# Patient Record
Sex: FEMALE | URBAN - NONMETROPOLITAN AREA
[De-identification: names, ages, dates, MRNs, and addresses within clinical notes are randomized per-mention and may not be internally consistent; named-entity substitution may affect disease eponyms.]

---

## 2019-07-31 ENCOUNTER — APPOINTMENT (RX ONLY)
Dept: URBAN - NONMETROPOLITAN AREA CLINIC 9 | Facility: CLINIC | Age: 64
Setting detail: DERMATOLOGY
End: 2019-07-31

## 2019-07-31 DIAGNOSIS — Z41.9 ENCOUNTER FOR PROCEDURE FOR PURPOSES OTHER THAN REMEDYING HEALTH STATE, UNSPECIFIED: ICD-10-CM

## 2019-07-31 PROBLEM — E03.9 HYPOTHYROIDISM, UNSPECIFIED: Status: ACTIVE | Noted: 2019-07-31

## 2019-07-31 PROBLEM — M12.9 ARTHROPATHY, UNSPECIFIED: Status: ACTIVE | Noted: 2019-07-31

## 2019-07-31 PROCEDURE — ? MEDICAL CONSULTATION: FILLERS

## 2019-07-31 PROCEDURE — ? COSMETIC CONSULTATION: IPL

## 2019-07-31 NOTE — HPI: COSMETIC CONSULTATION
When Outside In The Sun, Do You...: mostly tans, rarely burns
Additional History: Patient spends part of her year in [place] and will be traveling there soon. She doesn’t currently use sunscreen.

## 2019-08-09 ENCOUNTER — APPOINTMENT (RX ONLY)
Dept: URBAN - NONMETROPOLITAN AREA CLINIC 9 | Facility: CLINIC | Age: 64
Setting detail: DERMATOLOGY
End: 2019-08-09

## 2019-08-09 DIAGNOSIS — Z41.9 ENCOUNTER FOR PROCEDURE FOR PURPOSES OTHER THAN REMEDYING HEALTH STATE, UNSPECIFIED: ICD-10-CM

## 2019-08-09 PROCEDURE — ? JUVEDERM VOLUMA XC INJECTION

## 2019-08-09 ASSESSMENT — LOCATION DETAILED DESCRIPTION DERM
LOCATION DETAILED: LEFT CENTRAL ZYGOMA
LOCATION DETAILED: LEFT CENTRAL MALAR CHEEK
LOCATION DETAILED: LEFT MEDIAL MALAR CHEEK
LOCATION DETAILED: LEFT LATERAL MALAR CHEEK
LOCATION DETAILED: RIGHT MEDIAL MALAR CHEEK
LOCATION DETAILED: RIGHT CENTRAL MALAR CHEEK
LOCATION DETAILED: RIGHT CENTRAL ZYGOMA
LOCATION DETAILED: RIGHT SUPERIOR MEDIAL MALAR CHEEK
LOCATION DETAILED: RIGHT SUPERIOR CENTRAL MALAR CHEEK
LOCATION DETAILED: LEFT SUPERIOR CENTRAL MALAR CHEEK
LOCATION DETAILED: LEFT LATERAL ZYGOMA

## 2019-08-09 ASSESSMENT — LOCATION ZONE DERM: LOCATION ZONE: FACE

## 2019-08-09 ASSESSMENT — LOCATION SIMPLE DESCRIPTION DERM
LOCATION SIMPLE: RIGHT CHEEK
LOCATION SIMPLE: LEFT CHEEK
LOCATION SIMPLE: RIGHT ZYGOMA
LOCATION SIMPLE: LEFT ZYGOMA

## 2019-08-09 NOTE — PROCEDURE: JUVEDERM VOLUMA XC INJECTION
Additional Anesthesia Volume In Cc: 6
Brows Filler Volume In Cc: 0
Price (Use Numbers Only, No Special Characters Or $): 1800
Use Map Statement For Sites (Optional): Yes
Cheeks Filler Volume In Cc: 2
Expiration Date (Month Year): 2020-11-11
Anesthesia Volume In Cc: 0.5
Map Statment: See Attach Map for Complete Details
Procedural Text: The filler was administered to the treatment areas noted above.
Detail Level: Detailed
Consent: Written consent obtained. Risks include but not limited to bruising, beading, irregular texture, ulceration, infection, allergic reaction, scar formation, incomplete augmentation, temporary nature, procedural pain.
Filler: Juvederm Voluma XC
Include Cannula Information In Note?: No
Lot #: [id number]
Post-Care Instructions: Ice was applied for 10 minutes after injections. Patient was directed to avoid rubbing the skin, bending, stooping or lifting for 24 hours. Discussed cleansing of face and avoidance of any topical therapy or makeup for 24 hours.  After injections the patient stated she liked the subtle look. There was one injection site on the right medical cheek that may bruise. She has a cluster of superficial vessels in this region.

## 2021-03-30 ENCOUNTER — APPOINTMENT (RX ONLY)
Dept: URBAN - NONMETROPOLITAN AREA CLINIC 9 | Facility: CLINIC | Age: 66
Setting detail: DERMATOLOGY
End: 2021-03-30

## 2021-03-30 DIAGNOSIS — Z41.9 ENCOUNTER FOR PROCEDURE FOR PURPOSES OTHER THAN REMEDYING HEALTH STATE, UNSPECIFIED: ICD-10-CM

## 2021-03-30 PROCEDURE — ? VENUS VERSA IPL

## 2021-03-30 ASSESSMENT — LOCATION DETAILED DESCRIPTION DERM: LOCATION DETAILED: RIGHT CENTRAL MALAR CHEEK

## 2021-03-30 ASSESSMENT — LOCATION ZONE DERM: LOCATION ZONE: FACE

## 2021-03-30 ASSESSMENT — LOCATION SIMPLE DESCRIPTION DERM: LOCATION SIMPLE: RIGHT CHEEK

## 2021-03-30 NOTE — HPI: COSMETIC (IPL)
Eye Color: blue
Have You Had Laser Removal Of Brown Spots Before?: has not had previous treatment
When Outside In The Sun, Do You...: rarely burns, tans with ease

## 2021-03-30 NOTE — PROCEDURE: VENUS VERSA IPL
Detail Level: Zone
Post-Care Instructions: I reviewed with the patient in detail post-care instructions. Patient should stay away from the sun and wear sun protection until treated areas are fully healed.  Patient asked about swimming in chlorinated pool.  Discussed the need to stay ahead of dryness and if there are no open or irritated areas of her face she is okay to swim.
Fluence: 14
External Cooling Fan Speed: 0
Consent: Written consent obtained, risks reviewed including but not limited to crusting, scabbing, blistering, scarring, darker or lighter pigmentary change, bruising, and/or incomplete response.
Price (Use Numbers Only, No Special Characters Or $): 040
Skin Type (Optional): III
Pulse Mode: single
Treatment Number: 1
Pulse Duration (In Milliseconds): 15
Anesthesia Type: 1% lidocaine with epinephrine
Fluence Units: J/cm2
Treated Area: medium area
Frequency: 1 Hz
Pulse Count: [id number]
Applicator: [facility]
Comment: Patient has considerable red and brown on face.  Some of the growths are raised and we discussed which ones will likely respond and which may not.  She has both small hemangiomas and telangiectasias that should respond nicely.  She had a very good response today.  Because of the time of the year, we may not be able to get an additional treatment in this spring if more are required.  She will let me know in 4 weeks if she needs additional treatments with the next one to be scheduled in 6 weeks.
Device Serial Number (Optional): [id number]

## 2021-05-12 ENCOUNTER — APPOINTMENT (RX ONLY)
Dept: URBAN - NONMETROPOLITAN AREA CLINIC 9 | Facility: CLINIC | Age: 66
Setting detail: DERMATOLOGY
End: 2021-05-12

## 2021-05-12 DIAGNOSIS — Z41.9 ENCOUNTER FOR PROCEDURE FOR PURPOSES OTHER THAN REMEDYING HEALTH STATE, UNSPECIFIED: ICD-10-CM

## 2021-05-12 PROCEDURE — ? VENUS VERSA IPL

## 2021-05-12 ASSESSMENT — LOCATION DETAILED DESCRIPTION DERM: LOCATION DETAILED: RIGHT CENTRAL MALAR CHEEK

## 2021-05-12 ASSESSMENT — LOCATION ZONE DERM: LOCATION ZONE: FACE

## 2021-05-12 ASSESSMENT — LOCATION SIMPLE DESCRIPTION DERM: LOCATION SIMPLE: RIGHT CHEEK

## 2021-05-12 NOTE — PROCEDURE: VENUS VERSA IPL
Detail Level: Zone
Post-Care Instructions: I reviewed with the patient in detail post-care instructions. Patient should stay away from the sun and wear sun protection until treated areas are fully healed.  Patient asked about swimming in chlorinated pool.  Discussed the need to stay ahead of dryness and if there are no open or irritated areas of her face she is okay to swim.
Fluence: 16
External Cooling Fan Speed: 0
Consent: Written consent obtained, risks reviewed including but not limited to crusting, scabbing, blistering, scarring, darker or lighter pigmentary change, bruising, and/or incomplete response.
Price (Use Numbers Only, No Special Characters Or $): 704
Skin Type (Optional): III
Pulse Mode: single
Treatment Number: 2
Number Of Passes: 1
Pulse Duration (In Milliseconds): 15
Anesthesia Type: 1% lidocaine with epinephrine
Fluence Units: J/cm2
Treated Area: medium area
Frequency: 1 Hz
Pulse Count: [id number]
Applicator: [facility]
Comment: Patient has considerable red and brown on face.  Some of the growths are raised and we discussed which ones will likely respond and which may not.  She has both small hemangiomas and telangiectasias that should respond nicely.  She had a very good response today.  Because of the time of the year, we may not be able to get an additional treatment in this spring if more are required.  She will let me know in 4 weeks if she needs additional treatments with the next one to be scheduled in 6 weeks.
Device Serial Number (Optional): P/[id number]